# Patient Record
Sex: FEMALE | NOT HISPANIC OR LATINO | Employment: FULL TIME | URBAN - METROPOLITAN AREA
[De-identification: names, ages, dates, MRNs, and addresses within clinical notes are randomized per-mention and may not be internally consistent; named-entity substitution may affect disease eponyms.]

---

## 2019-02-18 ENCOUNTER — HOSPITAL ENCOUNTER (EMERGENCY)
Facility: CLINIC | Age: 24
Discharge: HOME | End: 2019-02-18
Attending: NURSE PRACTITIONER | Admitting: NURSE PRACTITIONER

## 2019-02-18 VITALS
TEMPERATURE: 98.5 F | OXYGEN SATURATION: 100 % | RESPIRATION RATE: 16 BRPM | DIASTOLIC BLOOD PRESSURE: 87 MMHG | WEIGHT: 99 LBS | SYSTOLIC BLOOD PRESSURE: 125 MMHG | HEART RATE: 74 BPM

## 2019-02-18 DIAGNOSIS — N93.9 VAGINAL BLEEDING: ICD-10-CM

## 2019-02-18 LAB
ALBUMIN UR-MCNC: NEGATIVE MG/DL
APPEARANCE UR: CLEAR
B-HCG SERPL-ACNC: <1 IU/L (ref 0–5)
BASOPHILS # BLD AUTO: 0 10E9/L (ref 0–0.2)
BASOPHILS NFR BLD AUTO: 0.2 %
BILIRUB UR QL STRIP: NEGATIVE
COLOR UR AUTO: ABNORMAL
DIFFERENTIAL METHOD BLD: NORMAL
EOSINOPHIL # BLD AUTO: 0.1 10E9/L (ref 0–0.7)
EOSINOPHIL NFR BLD AUTO: 1.2 %
ERYTHROCYTE [DISTWIDTH] IN BLOOD BY AUTOMATED COUNT: 13.3 % (ref 10–15)
GLUCOSE UR STRIP-MCNC: NEGATIVE MG/DL
HCG UR QL: NEGATIVE
HCT VFR BLD AUTO: 35.7 % (ref 35–47)
HGB BLD-MCNC: 12 G/DL (ref 11.7–15.7)
HGB UR QL STRIP: ABNORMAL
IMM GRANULOCYTES # BLD: 0 10E9/L (ref 0–0.4)
IMM GRANULOCYTES NFR BLD: 0.2 %
KETONES UR STRIP-MCNC: NEGATIVE MG/DL
LEUKOCYTE ESTERASE UR QL STRIP: NEGATIVE
LYMPHOCYTES # BLD AUTO: 2.1 10E9/L (ref 0.8–5.3)
LYMPHOCYTES NFR BLD AUTO: 34 %
MCH RBC QN AUTO: 28.9 PG (ref 26.5–33)
MCHC RBC AUTO-ENTMCNC: 33.6 G/DL (ref 31.5–36.5)
MCV RBC AUTO: 86 FL (ref 78–100)
MONOCYTES # BLD AUTO: 0.4 10E9/L (ref 0–1.3)
MONOCYTES NFR BLD AUTO: 7.2 %
NEUTROPHILS # BLD AUTO: 3.5 10E9/L (ref 1.6–8.3)
NEUTROPHILS NFR BLD AUTO: 57.2 %
NITRATE UR QL: NEGATIVE
NRBC # BLD AUTO: 0 10*3/UL
NRBC BLD AUTO-RTO: 0 /100
PH UR STRIP: 7 PH (ref 5–7)
PLATELET # BLD AUTO: 333 10E9/L (ref 150–450)
RBC # BLD AUTO: 4.15 10E12/L (ref 3.8–5.2)
RBC #/AREA URNS AUTO: 0 /HPF (ref 0–2)
SOURCE: ABNORMAL
SP GR UR STRIP: 1 (ref 1–1.03)
SPECIMEN SOURCE: NORMAL
UROBILINOGEN UR STRIP-MCNC: NORMAL MG/DL (ref 0–2)
WBC # BLD AUTO: 6.2 10E9/L (ref 4–11)
WBC #/AREA URNS AUTO: <1 /HPF (ref 0–5)
WET PREP SPEC: NORMAL

## 2019-02-18 PROCEDURE — 81001 URINALYSIS AUTO W/SCOPE: CPT | Performed by: EMERGENCY MEDICINE

## 2019-02-18 PROCEDURE — 87210 SMEAR WET MOUNT SALINE/INK: CPT | Performed by: NURSE PRACTITIONER

## 2019-02-18 PROCEDURE — 85025 COMPLETE CBC W/AUTO DIFF WBC: CPT | Performed by: NURSE PRACTITIONER

## 2019-02-18 PROCEDURE — 99283 EMERGENCY DEPT VISIT LOW MDM: CPT

## 2019-02-18 PROCEDURE — 81025 URINE PREGNANCY TEST: CPT | Performed by: EMERGENCY MEDICINE

## 2019-02-18 PROCEDURE — 84702 CHORIONIC GONADOTROPIN TEST: CPT | Performed by: NURSE PRACTITIONER

## 2019-02-18 SDOH — HEALTH STABILITY: MENTAL HEALTH: HOW OFTEN DO YOU HAVE A DRINK CONTAINING ALCOHOL?: NEVER

## 2019-02-18 ASSESSMENT — ENCOUNTER SYMPTOMS
ABDOMINAL PAIN: 0
FEVER: 0

## 2019-02-18 NOTE — ED PROVIDER NOTES
History     Chief Complaint:  Vaginal bleeding      HPI   Ann Mixon is a 24 year old female who presents with vaginal bleeding onset today after she took a positive home pregnancy test last night, but notes the pregnancy test became positive after sitting out for awhile. Patient is not sure if she is truly pregnant or having her normal period. Last known menstrual period was 12/4. She states it is common for her to not get a period every month. She also endorses breast tenderness and increased, vaginal discharge for the past two weeks. She has been diagnosed with bacterial vaginosis before and she states this feels similar. She and her  are currently trying to conceive. Patient denies fever and abdominal pain.     Allergies:  No known drug allergies.    Medications:    The patient is not currently taking any prescribed medications.    Past Medical History:    History reviewed. No pertinent past medical history.    Past Surgical History:    History reviewed. No pertinent surgical history.    Family History:    History reviewed. No pertinent family history.    Social History:  Presents to the ED alone.   Tobacco Use: Never  Alcohol Use: No     Review of Systems   Constitutional: Negative for fever.   Gastrointestinal: Negative for abdominal pain.   Genitourinary: Positive for vaginal bleeding and vaginal discharge.   All other systems reviewed and are negative.    Physical Exam   First Vitals:  BP: 125/87  Pulse: 74  Heart Rate: 74  Temp: 98.5  F (36.9  C)  Resp: 16  Weight: 44.9 kg (99 lb)  SpO2: 100 %    Physical Exam  Nursing notes reviewed. Vitals reviewed.  General: Alert. Well kept.  Eyes:  Conjunctiva non-injected, non-icteric.  Neck/Throat: Moist mucous membranes. Normal voice.  Cardiac: Regular rhythm. Normal heart sounds.  Pulmonary: Clear and equal breath sounds bilaterally.   Abdomen: Soft, with no suprapubic or right/left lower quadrant pain. No CVA tenderness.  Musculoskeletal: Normal gross  range of motion of all 4 extremities.   Neurological: Alert and oriented x4.   Skin: Warm and dry. Normal appearance of visualized exposed skin without rashes or petechiae.  Psych: Affect normal. Good eye contact.    Emergency Department Course     Laboratory:  HCG urine: Negative  HCG quantitative blood: <1  UA: Clear straw urine, specific gravity 1.002 (L), moderate blood, otherwise WNL  Wet Prep: Few PMN's seen.  No Trichomonas seen.  No clue cells seen. No yeast seen.  CBC:  WBC 6.2, HGB 12.0, , otherwise WNL    Emergency Department Course:  The patient arrived in triage where vitals were measured and recorded.   The patient was then escorted back to the emergency department.   The patient's medical records were reviewed.  Nursing notes and vitals were reviewed.  1500: I performed an exam of the patient as documented above.  The above workup was undertaken.   1557: I rechecked the patient and discussed results.  Findings and plan explained to the Patient. Patient discharged home, status improved, with instructions regarding supportive care, medications, and reasons to return as well as the importance of close follow-up was reviewed.     Impression & Plan      Medical Decision Making:  Ann Mixon is a 24 year old female presenting to the ER for evaluation of vaginal bleeding.  VS on presentation are normal.  DDx is broad, including but not limited to dysfunctional uterine bleeding, menorrhagia, menometrorrhagia, structural abnormalities (fibroids, polyp, pelvic tumor), atrophic endometrium (post-menopausal), trauma (post-coital bleeding), foreign body, infection, underlying coagulopathy, pregnancy related (ectopic, miscarriage, abruption, placenta previa, trauma), among others.  Pregnancy test returned negative.      Based on the above history and evaluation, I suspect symptoms are most likely related to dysfunctional uterine bleeding. No history of underlying coagulopathy, complicating current  presentation. With regards to the degree of bleeding, lab work and vital signs do not reveal significant bleeding.  VS reveal absence of tachycardia nor hypotension.  Hgb returned within normal limits.  In light of the above history, examination, and ED course, patient is felt safe for outpatient management and close follow-up.  I have recommended follow-up with OB/GYN in 2 days to discuss her irregular menstrual cycles.  Warning signs were discussed which should prompt return to the ER, including increasing abdominal pain, bleeding through more than 1 pad per hour for 3-4 hours, fever, fainting, dizziness, shortness of breath, or any other new or troubling symptoms.  Referral information was provided.  All questions were answered prior to discharge.    Diagnosis:    ICD-10-CM    1. Vaginal bleeding N93.9      Disposition:  Discharged to home.   Sharon SHEIKH, am serving as a scribe on 2/18/2019 at 3:00 PM to personally document services performed by Pamela Medeiros CNP, based on my observations and the provider's statements to me.    EMERGENCY DEPARTMENT       Pamela Medeiros CNP  02/18/19 [id number]

## 2019-02-18 NOTE — ED AVS SNAPSHOT
Emergency Department  [address] [place] [id number]  Phone:  [phone]  Fax:  [phone]                                    Ann Mixon   MRN: [id number]    Department:   Emergency Department   Date of Visit:  2/18/2019           After Visit Summary Signature Page    I have received my discharge instructions, and my questions have been answered. I have discussed any challenges I see with this plan with the nurse or doctor.    ..........................................................................................................................................  Patient/Patient Representative Signature      ..........................................................................................................................................  Patient Representative Print Name and Relationship to Patient    ..................................................               ................................................  Date                                   Time    ..........................................................................................................................................  Reviewed by Signature/Title    ...................................................              ..............................................  Date                                               Time          22EPIC Rev 08/18

## 2022-04-06 ENCOUNTER — APPOINTMENT (OUTPATIENT)
Dept: GENERAL RADIOLOGY | Facility: CLINIC | Age: 27
End: 2022-04-06
Attending: STUDENT IN AN ORGANIZED HEALTH CARE EDUCATION/TRAINING PROGRAM
Payer: COMMERCIAL

## 2022-04-06 ENCOUNTER — HOSPITAL ENCOUNTER (OUTPATIENT)
Facility: CLINIC | Age: 27
Setting detail: OBSERVATION
Discharge: HOME | End: 2022-04-07
Attending: EMERGENCY MEDICINE | Admitting: OBSTETRICS & GYNECOLOGY
Payer: COMMERCIAL

## 2022-04-06 DIAGNOSIS — O99.891 BACK PAIN AFFECTING PREGNANCY IN SECOND TRIMESTER: ICD-10-CM

## 2022-04-06 DIAGNOSIS — M54.9 BACK PAIN AFFECTING PREGNANCY IN SECOND TRIMESTER: ICD-10-CM

## 2022-04-06 DIAGNOSIS — O47.02: ICD-10-CM

## 2022-04-06 LAB
ABO/RH(D): NORMAL
ANTIBODY SCREEN: NEGATIVE
SARS-COV-2 RNA RESP QL NAA+PROBE: NEGATIVE
SPECIMEN EXPIRATION DATE: NORMAL

## 2022-04-06 PROCEDURE — G0378 HOSPITAL OBSERVATION PER HR: HCPCS

## 2022-04-06 PROCEDURE — C9803 HOPD COVID-19 SPEC COLLECT: HCPCS

## 2022-04-06 PROCEDURE — 250N000013 HC RX MED GY IP 250 OP 250 PS 637: Performed by: STUDENT IN AN ORGANIZED HEALTH CARE EDUCATION/TRAINING PROGRAM

## 2022-04-06 PROCEDURE — 72020 X-RAY EXAM OF SPINE 1 VIEW: CPT

## 2022-04-06 PROCEDURE — 120N000001 HC R&B MED SURG/OB

## 2022-04-06 PROCEDURE — U0003 INFECTIOUS AGENT DETECTION BY NUCLEIC ACID (DNA OR RNA); SEVERE ACUTE RESPIRATORY SYNDROME CORONAVIRUS 2 (SARS-COV-2) (CORONAVIRUS DISEASE [COVID-19]), AMPLIFIED PROBE TECHNIQUE, MAKING USE OF HIGH THROUGHPUT TECHNOLOGIES AS DESCRIBED BY CMS-2020-01-R: HCPCS | Performed by: STUDENT IN AN ORGANIZED HEALTH CARE EDUCATION/TRAINING PROGRAM

## 2022-04-06 PROCEDURE — 99285 EMERGENCY DEPT VISIT HI MDM: CPT

## 2022-04-06 RX ORDER — DEXTROSE, SODIUM CHLORIDE, SODIUM LACTATE, POTASSIUM CHLORIDE, AND CALCIUM CHLORIDE 5; .6; .31; .03; .02 G/100ML; G/100ML; G/100ML; G/100ML; G/100ML
125 INJECTION, SOLUTION INTRAVENOUS CONTINUOUS
Status: DISCONTINUED | OUTPATIENT
Start: 2022-04-07 | End: 2022-04-07 | Stop reason: HOSPADM

## 2022-04-06 RX ORDER — ONDANSETRON 4 MG/1
4 TABLET, ORALLY DISINTEGRATING ORAL EVERY 6 HOURS PRN
Status: DISCONTINUED | OUTPATIENT
Start: 2022-04-06 | End: 2022-04-07 | Stop reason: HOSPADM

## 2022-04-06 RX ORDER — METOCLOPRAMIDE 10 MG/1
10 TABLET ORAL EVERY 6 HOURS PRN
Status: DISCONTINUED | OUTPATIENT
Start: 2022-04-06 | End: 2022-04-07 | Stop reason: HOSPADM

## 2022-04-06 RX ORDER — LIDOCAINE 50 MG/G
1 PATCH TOPICAL EVERY 24 HOURS
Qty: 10 PATCH | Refills: 0 | COMMUNITY
Start: 2022-04-06 | End: 2022-04-16

## 2022-04-06 RX ORDER — PROCHLORPERAZINE MALEATE 5 MG
10 TABLET ORAL EVERY 6 HOURS PRN
Status: DISCONTINUED | OUTPATIENT
Start: 2022-04-06 | End: 2022-04-07 | Stop reason: HOSPADM

## 2022-04-06 RX ORDER — METOCLOPRAMIDE HYDROCHLORIDE 5 MG/ML
10 INJECTION INTRAMUSCULAR; INTRAVENOUS EVERY 6 HOURS PRN
Status: DISCONTINUED | OUTPATIENT
Start: 2022-04-06 | End: 2022-04-07 | Stop reason: HOSPADM

## 2022-04-06 RX ORDER — PROCHLORPERAZINE 25 MG
25 SUPPOSITORY, RECTAL RECTAL EVERY 12 HOURS PRN
Status: DISCONTINUED | OUTPATIENT
Start: 2022-04-06 | End: 2022-04-07 | Stop reason: HOSPADM

## 2022-04-06 RX ORDER — LIDOCAINE 40 MG/G
CREAM TOPICAL
Status: DISCONTINUED | OUTPATIENT
Start: 2022-04-06 | End: 2022-04-07 | Stop reason: HOSPADM

## 2022-04-06 RX ORDER — DEXTROSE, SODIUM CHLORIDE, SODIUM LACTATE, POTASSIUM CHLORIDE, AND CALCIUM CHLORIDE 5; .6; .31; .03; .02 G/100ML; G/100ML; G/100ML; G/100ML; G/100ML
500 INJECTION, SOLUTION INTRAVENOUS ONCE
Status: COMPLETED | OUTPATIENT
Start: 2022-04-06 | End: 2022-04-07

## 2022-04-06 RX ORDER — LIDOCAINE 4 G/G
1 PATCH TOPICAL ONCE
Status: COMPLETED | OUTPATIENT
Start: 2022-04-06 | End: 2022-04-07

## 2022-04-06 RX ORDER — ACETAMINOPHEN 500 MG
1000 TABLET ORAL EVERY 4 HOURS PRN
Status: DISCONTINUED | OUTPATIENT
Start: 2022-04-06 | End: 2022-04-07 | Stop reason: HOSPADM

## 2022-04-06 RX ORDER — ONDANSETRON 2 MG/ML
4 INJECTION INTRAMUSCULAR; INTRAVENOUS EVERY 6 HOURS PRN
Status: DISCONTINUED | OUTPATIENT
Start: 2022-04-06 | End: 2022-04-07 | Stop reason: HOSPADM

## 2022-04-06 RX ADMIN — ACETAMINOPHEN 1000 MG: 500 TABLET, FILM COATED ORAL at 20:39

## 2022-04-06 RX ADMIN — LIDOCAINE 1 PATCH: 560 PATCH PERCUTANEOUS; TOPICAL; TRANSDERMAL at 20:39

## 2022-04-06 ASSESSMENT — ENCOUNTER SYMPTOMS
HEADACHES: 0
VOMITING: 0
SHORTNESS OF BREATH: 0
NERVOUS/ANXIOUS: 0
BLOOD IN STOOL: 0
DYSURIA: 0
COUGH: 0
ABDOMINAL PAIN: 0
CHILLS: 0
DIAPHORESIS: 0
RHINORRHEA: 0
BACK PAIN: 1
FEVER: 0
CONSTIPATION: 0
NAUSEA: 0
LIGHT-HEADEDNESS: 0
HEMATURIA: 0
DIARRHEA: 0
NECK PAIN: 0
SORE THROAT: 0
PALPITATIONS: 0

## 2022-04-06 ASSESSMENT — ACTIVITIES OF DAILY LIVING (ADL): ADLS_ACUITY_SCORE: 12

## 2022-04-07 ENCOUNTER — HOSPITAL ENCOUNTER (INPATIENT)
Facility: CLINIC | Age: 27
End: 2022-04-07
Admitting: OBSTETRICS & GYNECOLOGY
Payer: COMMERCIAL

## 2022-04-07 ENCOUNTER — APPOINTMENT (OUTPATIENT)
Dept: ULTRASOUND IMAGING | Facility: CLINIC | Age: 27
End: 2022-04-07
Attending: OBSTETRICS & GYNECOLOGY
Payer: COMMERCIAL

## 2022-04-07 VITALS
RESPIRATION RATE: 20 BRPM | BODY MASS INDEX: 18.61 KG/M2 | WEIGHT: 105 LBS | HEIGHT: 63 IN | SYSTOLIC BLOOD PRESSURE: 102 MMHG | TEMPERATURE: 98.9 F | DIASTOLIC BLOOD PRESSURE: 51 MMHG | HEART RATE: 115 BPM | OXYGEN SATURATION: 98 %

## 2022-04-07 LAB
ALBUMIN UR-MCNC: 70 MG/DL
APPEARANCE UR: ABNORMAL
BACTERIA #/AREA URNS HPF: ABNORMAL /HPF
BILIRUB UR QL STRIP: NEGATIVE
COLOR UR AUTO: YELLOW
ERYTHROCYTE [DISTWIDTH] IN BLOOD BY AUTOMATED COUNT: 13.6 % (ref 10–15)
FETAL RBC % LFV: 0 %
FETAL RBC (ML): 0 ML
GLUCOSE UR STRIP-MCNC: NEGATIVE MG/DL
HCT VFR BLD AUTO: 23.6 % (ref 35–47)
HGB BLD-MCNC: 7.6 G/DL (ref 11.7–15.7)
HGB UR QL STRIP: NEGATIVE
HOLD SPECIMEN: NORMAL
IF INDICATED RECOMMENDED DOSE OF RH IMMUNE GLOBULIN UG: 300 UG
KETONES UR STRIP-MCNC: 20 MG/DL
LEUKOCYTE ESTERASE UR QL STRIP: ABNORMAL
MCH RBC QN AUTO: 29.6 PG (ref 26.5–33)
MCHC RBC AUTO-ENTMCNC: 32.2 G/DL (ref 31.5–36.5)
MCV RBC AUTO: 92 FL (ref 78–100)
MUCOUS THREADS #/AREA URNS LPF: PRESENT /LPF
NITRATE UR QL: NEGATIVE
PH UR STRIP: 6 [PH] (ref 5–7)
PLATELET # BLD AUTO: 242 10E3/UL (ref 150–450)
RBC # BLD AUTO: 2.57 10E6/UL (ref 3.8–5.2)
RBC URINE: 2 /HPF
SP GR UR STRIP: 1.03 (ref 1–1.03)
SQUAMOUS EPITHELIAL: 1 /HPF
UROBILINOGEN UR STRIP-MCNC: 2 MG/DL
WBC # BLD AUTO: 10.3 10E3/UL (ref 4–11)
WBC CLUMPS #/AREA URNS HPF: PRESENT /HPF
WBC URINE: 60 /HPF

## 2022-04-07 PROCEDURE — G0378 HOSPITAL OBSERVATION PER HR: HCPCS

## 2022-04-07 PROCEDURE — 250N000013 HC RX MED GY IP 250 OP 250 PS 637: Performed by: STUDENT IN AN ORGANIZED HEALTH CARE EDUCATION/TRAINING PROGRAM

## 2022-04-07 PROCEDURE — 85460 HEMOGLOBIN FETAL: CPT | Performed by: OBSTETRICS & GYNECOLOGY

## 2022-04-07 PROCEDURE — 86901 BLOOD TYPING SEROLOGIC RH(D): CPT | Performed by: OBSTETRICS & GYNECOLOGY

## 2022-04-07 PROCEDURE — 81001 URINALYSIS AUTO W/SCOPE: CPT | Performed by: OBSTETRICS & GYNECOLOGY

## 2022-04-07 PROCEDURE — 250N000013 HC RX MED GY IP 250 OP 250 PS 637: Performed by: OBSTETRICS & GYNECOLOGY

## 2022-04-07 PROCEDURE — 36415 COLL VENOUS BLD VENIPUNCTURE: CPT | Performed by: OBSTETRICS & GYNECOLOGY

## 2022-04-07 PROCEDURE — 76770 US EXAM ABDO BACK WALL COMP: CPT

## 2022-04-07 PROCEDURE — 85027 COMPLETE CBC AUTOMATED: CPT | Performed by: OBSTETRICS & GYNECOLOGY

## 2022-04-07 PROCEDURE — 250N000011 HC RX IP 250 OP 636: Performed by: OBSTETRICS & GYNECOLOGY

## 2022-04-07 PROCEDURE — 87186 SC STD MICRODIL/AGAR DIL: CPT | Performed by: OBSTETRICS & GYNECOLOGY

## 2022-04-07 RX ORDER — FERROUS SULFATE 325(65) MG
325 TABLET ORAL DAILY
Status: DISCONTINUED | OUTPATIENT
Start: 2022-04-07 | End: 2022-04-07 | Stop reason: HOSPADM

## 2022-04-07 RX ORDER — PRENATAL VIT/IRON FUM/FOLIC AC 27MG-0.8MG
1 TABLET ORAL DAILY
COMMUNITY

## 2022-04-07 RX ORDER — NITROFURANTOIN 25; 75 MG/1; MG/1
100 CAPSULE ORAL EVERY 12 HOURS SCHEDULED
Status: DISCONTINUED | OUTPATIENT
Start: 2022-04-07 | End: 2022-04-07 | Stop reason: HOSPADM

## 2022-04-07 RX ADMIN — SODIUM CHLORIDE, SODIUM LACTATE, POTASSIUM CHLORIDE, CALCIUM CHLORIDE AND DEXTROSE MONOHYDRATE 125 ML/HR: 5; 600; 310; 30; 20 INJECTION, SOLUTION INTRAVENOUS at 05:41

## 2022-04-07 RX ADMIN — ACETAMINOPHEN 1000 MG: 500 TABLET, FILM COATED ORAL at 07:09

## 2022-04-07 RX ADMIN — SODIUM CHLORIDE, SODIUM LACTATE, POTASSIUM CHLORIDE, CALCIUM CHLORIDE AND DEXTROSE MONOHYDRATE 500 ML: 5; 600; 310; 30; 20 INJECTION, SOLUTION INTRAVENOUS at 00:07

## 2022-04-07 RX ADMIN — ACETAMINOPHEN 1000 MG: 500 TABLET, FILM COATED ORAL at 11:48

## 2022-04-07 RX ADMIN — NITROFURANTOIN MONOHYDRATE/MACROCRYSTALLINE 100 MG: 25; 75 CAPSULE ORAL at 08:38

## 2022-04-07 RX ADMIN — FERROUS SULFATE TAB 325 MG (65 MG ELEMENTAL FE) 325 MG: 325 (65 FE) TAB at 08:38

## 2022-04-07 NOTE — UTILIZATION REVIEW
"  Admission Status; Secondary Review Determination         Under the authority of the Utilization Management Committee, the utilization review process indicated a secondary review on the above patient.  The review outcome is based on review of the medical records, discussions with staff, and applying clinical experience noted on the date of the review.          (x) Observation Status Appropriate - This patient does not meet hospital inpatient criteria and is placed in observation status. If this patient's primary payer is Medicare and was admitted as an inpatient, Condition Code 44 should be used and patient status changed to \"observation\".     RATIONALE FOR DETERMINATION   \"27-year-old G3, P2-0-0-2, at 25-2/7 weeks with a febrile episode, no increase in white count and right-sided back pain. Febrile episode:  This appears to be more viral in origin; however, due to her possible either contaminated versus urinary tract infection, we will start her on Macrobid b.i.d.  We will also get a kidney, ureter, bladder ultrasound to make sure that there is no hydronephrosis, stone or obstructing pathology. \"     The severity of illness, intensity of service provided, expected LOS and risk for adverse outcome make the care appropriate for further observation; however, doesn't meet criteria for hospital inpatient admission. Dr Hicks  notified of this determination.    This document was produced using voice recognition software.      The information on this document is developed by the utilization review team in order for the business office to ensure compliance.  This only denotes the appropriateness of proper admission status and does not reflect the quality of care rendered.         The definitions of Inpatient Status and Observation Status used in making the determination above are those provided in the CMS Coverage Manual, Chapter 1 and Chapter 6, section 70.4.      Sincerely,     ARIELLE TODD MD    System Medical " Director  Utilization Management  [facility].

## 2022-04-07 NOTE — DISCHARGE INSTRUCTIONS
Discharge Instruction for Undelivered Patients      You were seen for: Back pain 25 2/7 wks gestation.   We Consulted: Dr Dan, Dr Hicks [facility].   You had fetal and uterine monitoring, urinalysis, urine culture (pending), renal ultrasound (normal), pain medication, IV hydration, antibiotic therapy, blood work CBC (normal).      Diet:   As tolerated.     Activity:  As tolerated.     Call your provider if you notice:  Swelling in your face or increased swelling in your hands or legs.  Headaches that are not relieved by Tylenol (acetaminophen).  Changes in your vision (blurring: seeing spots or stars.)  Nausea (sick to your stomach) and vomiting (throwing up).   Weight gain of 5 pounds or more per week.  Heartburn that doesn't go away.  Signs of bladder infection: pain when you urinate (use the toilet), need to go more often and more urgently.  The bag of hanna (rupture of membranes) breaks, or you notice leaking in your underwear.  Bright red blood in your underwear.  Abdominal (lower belly) or stomach pain.  Second (plus) baby: Contractions (tightening) less than 10 minutes apart and getting stronger.  *If less than 34 weeks: Contractions (tightening) more than 6 times in one hour.  Increase or change in vaginal discharge (note the color and amount)  Other: Take Tylenol (Acetaminophen) for pain. Warm pack to back for comfort. Antibiotic Macrobid 100mg X7 days called to [facility] on [address].   Any problems or concerns, reach out to your primary OB clinic.     Follow-up:  Primary OB  tomorrow 4/8.

## 2022-04-07 NOTE — ED PROVIDER NOTES
History   Chief Complaint:  Back Pain (Generalized back pain for the last 3 weeks since having a fall at work. Increased pain the last few days made worse with movement and sitting. Pt. is 6 weeks preg. and had follow up after. )       ZHOU Mixon is a 27 year old female, , LMP 10/10/2021, BRANDYN 2022, who presents with upper back pain.  Patient reports that she fell 3 weeks ago, slipping on ice at work.  She was evaluated by her OB in the office following this fall.  She then fell again 1.5 weeks ago, slipping on ice again.  She was again evaluated by her OB in the office following this fall.  At both visits, she was reassured that her pregnancy was proceeding with no complications.  She has had persistent back pain, which she has been treating with Tylenol.  Today, however her back pain became more severe, and she called the OB on-call, who set her up with an appointment with the midwife tomorrow morning as well as provided referrals for massage therapy and physical therapy.  Patient states that she was in too much pain tonight to wait for these appointments and presented to the ED.  She states she last took Tylenol at 4 PM.  She currently rates her pain 10 out of 10.    Patient denies contractions, vaginal discharge, vaginal bleeding, leaking, or abdominal pain.  She notices increased fetal movement.  She did note some intermittent tightening of her abdomen, which she attributed to her back pain.    Review of Systems   Constitutional: Negative for chills, diaphoresis and fever.   HENT: Negative for ear pain, rhinorrhea and sore throat.    Eyes: Negative for visual disturbance.   Respiratory: Negative for cough and shortness of breath.    Cardiovascular: Negative for chest pain, palpitations and leg swelling.   Gastrointestinal: Negative for abdominal pain, blood in stool, constipation, diarrhea, nausea and vomiting.   Genitourinary: Negative for dysuria, hematuria, pelvic pain, urgency, vaginal  "discharge and vaginal pain.   Musculoskeletal: Positive for back pain. Negative for neck pain.   Skin: Negative for pallor.   Neurological: Negative for syncope, light-headedness and headaches.   Psychiatric/Behavioral: The patient is not nervous/anxious.    All other systems reviewed and are negative.      Allergies:  The patient has no known allergies.     Medications:  Reglan  Aspirin 81  Klor-con  Prenatal vitamin    Past Medical History:     Anemia  Gestational diabetes mellitus    Past Surgical History:     section     Family History:    Brother - diabetes  Mother - diabetes, hypertension  Sister - cancer    Social History:  Denies tobacco, alcohol, or recreational drugs.    Physical Exam     Patient Vitals for the past 24 hrs:   BP Temp Temp src Pulse Resp SpO2 Height Weight   22 -- -- -- -- -- 99 % -- --   22 -- -- -- -- -- 100 % -- --   22 104/54 -- -- 102 -- -- -- --   22 1938 104/41 97  F (36.1  C) Temporal 112 16 98 % 1.6 m (5' 3\") 47.6 kg (105 lb)       Physical Exam  Vitals: Reviewed, as above.   General: Alert and oriented, in moderate distress. Standing in the room with hands on back.  Skin: Warm and well-perfused. No rashes, lesions, or erythema.   HEENT: Head: Normocephalic, atraumatic. Facial features symmetric. Eyes: Conjunctiva pink, sclera white. EOMs grossly intact.Ears: Auricles without lesion, erythema, or edema. Mouth and throat: Lips are moist with no chapping, lesions, or edema, Buccal mucosa is pink and moist without lesions. Oropharyngeal mucosa is pink and moist with no erythema, edema, or exudate.   Neck: Supple with no lymphadenopathy. Full ROM.   Pulmonary: Chest wall expansion symmetric with no increased work of breathing. Lungs clear to auscultation bilaterally.   Cardiovascular: Heart RRR with no murmurs, rubs, or gallops. 2+ radial and tibialis posterior pulses bilaterally. No peripheral edema.  Abdominal: Gravid.  Uterus is " palpable 2 cm above the umbilicus no hernias, scars, lesions, striae, or distension. Bowel sounds present and physiologic. Abdomen is soft and nontender to light and deep palpation in all 4 quadrants with no guarding or rebound.   Musculoskeletal: Moves all extremities spontaneously.  Midline spinal tenderness at the T1 level.  Muscle tightness in the right trapezius.  No other midline spinal tenderness.  Psych: Affect appropriate.  Answers questions appropriately. Patient appears calm.      Emergency Department Course     Imaging:  XR Thoracic Spine 1 View   Final Result   IMPRESSION:Single lateral view of the thoracic spine obtained. No fracture. Normal vertebral heights and alignment. Normal disc spaces for age. Normal extraspinal structures.         Report per radiology    Laboratory:  Labs Ordered and Resulted from Time of ED Arrival to Time of ED Departure - No data to display       Emergency Department Course:     Reviewed:  I reviewed nursing notes, vitals and past medical history    Assessments/Consults:  ED Course as of 22 I obtained history and physical exam, as noted above.     I rechecked the patient and explained findings.    OB nurse at bedside to obtain doppler tones and assess patient.    I rechecked the patient and provided an update.         Interventions:  Medications   Lidocaine (LIDOCARE) 4 % Patch 1 patch (1 patch Transdermal Patch/Med Applied 22)     And   lidocaine patch in PLACE (has no administration in time range)   acetaminophen (TYLENOL) tablet 1,000 mg (1,000 mg Oral Given 22)       Disposition:  The patient was admitted to the hospital under the care of Dr. Dan.    Impression & Plan     CMS Diagnoses: None      Medical Decision Making:  Ann is a 27 year old female, , LMP 10/10/2021, BRANDYN 2022, who presents with upper back pain.  Please HPI and physical exam for full details.  Differential diagnosis  included compression fracture, musculoskeletal strain, cauda equina syndrome, spinal epidural abscess, premature labor, etc.  Patient is afebrile.  She has mild tachycardia, however she is experiencing acute pain, which she rates 10 out of 10.  Patient's pain was treated with a lidocaine patch and Tylenol while in the department.  She had some relief with these interventions.  Due to midline spinal tenderness, a 1-view lateral thoracic spine x-ray was obtained, which was negative for fracture or other acute pathology.    OB nurse came down to assess the patient per hospital protocol.  Fetal heart tones were reassuring.  Due to patient's report of abdominal tightening, she placed patient on the monitor, which showed she was viktor every 4 to 5 minutes.  She spoke with Dr. Dan, who will be accepting the patient for admission and for further care.       Critical Care Time: was 0 minutes for this patient excluding procedures    Diagnosis:    ICD-10-CM    1. Back pain affecting pregnancy in second trimester  O99.891     M54.9    2. Premature uterine contractions, antepartum, second trimester  O47.02        Scribe Disclosure:  I, Ema Vasquez, am serving as a scribe at 7:48 PM on 4/6/2022 to document services personally performed by Hina Collins PA-C based on my observations and the provider's statements to me.            Hina Collins PA-C  04/06/22 [id number]

## 2022-04-07 NOTE — ED TRIAGE NOTES
Generalized back pain for the last 3 weeks since having a fall at work. Increased pain the last few days made worse with movement and sitting. Pt. is 6 months  preg. and had follow up after.

## 2022-04-07 NOTE — PLAN OF CARE
Contractions spaced out overnight during observation. Pt stated this morning that she feels some tightening occasionally, but nothing painful. FHR tracing is appropriate for gestation.

## 2022-04-07 NOTE — PLAN OF CARE
"- This RN is called to the ER to monitor this  at 25 weeks. FHT's per doppler are 150's with audible accels, good fetal movement. Pt states she is being seen for back pain following a fall over 2 weeks ago. Pt states she did not have any trauma to her abdomen, as the fall resulted in her landing on her back. When asked, pt denies any contractions or cramping, denies LOF or bleeding and reports good fetal movement. Pt does state that she feels \"tightening\" in her abdomen, which she states has also been happening since the fall. .     - EFM applied to assess for uterine activity. Able to monitor FHT's with external US. Active fetus.   - Dr. Dan on call for unassigned patients (pt is typically seen through [facility]). Updated on pt history, complaint and  status- uterine  contractions every 4-5 minutes, which she associates with the tightening. Plan admit to L&D for overnight OBS.   "

## 2022-04-07 NOTE — ED PROVIDER NOTES
Emergency Department Attending Supervision Note  4/6/2022  7:48 PM      I evaluated this patient in conjunction with Hina Collins PA-C      Briefly, the patient presents at 25 weeks gestation with mid back pain.  She had fallen twice in the last 3 weeks on the ice and landed on her back.  She has seen her midwife a couple of times after the falls and baby has been doing well.  No vaginal bleeding or abdominal pain.  She denies any numbness or tingling or neurologic symptoms.  The pain is gotten a lot worse and she feels incredibly tight.  Her midwife is going to recommend a massage therapist for her.  She has been taking Tylenol.  She has been noticing some tightness in her abdomen.  There was no trauma to her abdomen or her uterus.      On my exam, patient appears uncomfortable, she is tender along the area between her shoulder blades and down toward the mid back.  She has a lot of spasm in her paraspinous muscles.  Does not have a lot of midline tenderness.  No abdominal tenderness.    Results: Thoracic x-ray shows no evidence of fracture.    ED course: We asked OB to come down to check fetal heart tones on the patient as she had fallen recently.  Fetal heart tones are normal but she was viktor every 4 to 5 minutes.  X-ray of the thoracic spine did not show any fracture.  The OB nurse was here and talked to Dr. Dan who will be admitting the patient up on the OB floor for further evaluation monitoring and treatment.  Labs will be ordered by Dr. Dan.  An IV is placed here.      Diagnosis    ICD-10-CM    1. Back pain affecting pregnancy in second trimester  O99.891     M54.9    2. Premature uterine contractions, antepartum, second trimester  O47.02          Aida Rutherford MD Powell, Tracy Alan, MD  04/06/22 [id number]       Aida Rutherford MD  04/06/22 4705

## 2022-04-07 NOTE — H&P
Admitted: 2022    INDICATIONS FOR PROCEDURE:  The patient is a 27-year-old G3, P2-0-0-2, at 25-2/7 weeks.  It sounds like she follows with an obstetrical clinic with [facility] and has had some complications over the last few weeks.  She notes that approximately a month and a half ago, she fell on the ice and hurt her back.  She has been having severe back pain since then.  She had a subsequent fall onto the side of her body approximately 2-1/2 weeks ago and continued to have back pain since.  She was seen at her primary obstetrical clinic and has been evaluated.  It sounds like she was told she had muscle tension and was to follow up with Physical Therapy and a chiropractor.    The last 4 days, her pain in her back has become more severe to the point that yesterday it was increasing in intensity and she was seen in the Emergency Department.  Upon admission, she had evaluation done showing anemia, which she notes that she has had her entire life and throughout other pregnancies.  She declines being evaluated for anemia or being told that she had thalassemia in the past.  She had a UA performed that did show to be possible infection versus a contaminated catch; this is pending for culture.  Over the last several hours, she feels like her back pain has gotten worse, being worse on the right than the left, and difficult for her to move around.    Please note that she is fully vaccinated.  She had COVID in January; however, yesterday, despite the fact she was not in direct contact with her spouse, her spouse did report a high fever last evening.  This morning, the patient herself had a temperature of 101.8.  White count at that time was 10.    PAST OBSTETRICAL HISTORY:  Significant for vaginal delivery x 1,  x 1 for nonreassuring heart tones.    PAST MEDICAL HISTORY:  Significant for anemia of unknown origin and back pain.    FAMILY HISTORY:  Noncontributory.    SOCIAL HISTORY:  She works outside the home.   She has no significant drug, alcohol or tobacco use history, and her spouse lives with her, as well as her 2 children.    REVIEW OF SYSTEMS:  Pertinent for abdominal cramping and right-sided back pain.    PHYSICAL EXAMINATION:    VITAL SIGNS:  Blood pressure 102/51, temperature of 98.9, respiratory rate of 20, heart rate of 115.  CARDIAC:  Regular rate and rhythm.  CHEST:  Clear to auscultation bilaterally.  ABDOMEN:  Soft, nontender.   BACK:  Paraspinous tenderness with no discrete CVA tenderness.  There are no deformities noted.   EXTREMITIES:  Soft, nontender.  No cords, erythema or edema.     DATA:  NST shows baseline in the 160s with no uterine activity and reactive for gestational age.    ASSESSMENT AND PLAN:  A 27-year-old G3, P2-0-0-2, at 25-2/7 weeks with a febrile episode, no increase in white count and right-sided back pain.    1.  Febrile episode:  This appears to be more viral in origin; however, due to her possible either contaminated versus urinary tract infection, we will start her on Macrobid b.i.d.  We will also get a kidney, ureter, bladder ultrasound to make sure that there is no hydronephrosis, stone or obstructing pathology.  If she remains in the hospital later today, we may consider having her CPT for this persistent chronic back pain in pregnancy and we will start an aqua K pad.  2.  We do have concern for a possible viral illness due to the fact that her spouse has similar symptoms.  She had been COVID swabbed inpatient, which is negative, and she is otherwise asymptomatic.    We will monitor her throughout the day to make sure that there is a reactive fetal heart rate tracing, that her fever has been resolved, and that her back pain has not increased.  Currently, the ultrasound is pending.  At discharge, I would recommend that she follows up with her primary OB for evaluation of her chronic anemia and treatment for that in pregnancy, as well as supportive measures for her chronic back  pain.    Lavern Hicks MD        D: 2022   T: 2022   MT: EL    Name:     SAY BERMANDafne  MRN:      [id number]        Account:     [id number]   :      1995           Admitted:    2022       Document: [id number]

## 2022-04-07 NOTE — PLAN OF CARE
Pt RUB US WNL. Pt temp 98.9. FHTs 140, moderate variability, accels present, no decels seen. Baby is active. Abdomen is soft and non tender. Pt using heat (Aqua K pad) to her back for comfort. Urine culture is pending. Rx Macrobid 100 mg BID X7 days per Dr Fabiola ANDRADE (RN will call RX to pt pharmacy [facility] on [address]). Pt to follow up with her OB Dr tomorrow.

## 2022-04-07 NOTE — PLAN OF CARE
Discharge instructions reviewed with pt. All questions answered. Pt agrees with plan to go home and follow up with her OB Dr tomorrow.

## 2022-04-09 LAB — BACTERIA UR CULT: ABNORMAL

## 2022-04-12 NOTE — DISCHARGE SUMMARY
Pt was admitted at 26 weeks with pack pain and was found to have subsequent contractions. She was in the hospital for less than 24 hour. She started treatment for possible UTI and back pain was felt to be musculoskeletal.   She was discharged home with precautions and was to follow-up with her primary OB.     Lavern Hicks MD